# Patient Record
Sex: FEMALE | Race: OTHER | NOT HISPANIC OR LATINO
[De-identification: names, ages, dates, MRNs, and addresses within clinical notes are randomized per-mention and may not be internally consistent; named-entity substitution may affect disease eponyms.]

---

## 2021-07-26 ENCOUNTER — APPOINTMENT (OUTPATIENT)
Dept: PULMONOLOGY | Facility: CLINIC | Age: 51
End: 2021-07-26
Payer: COMMERCIAL

## 2021-07-26 VITALS
DIASTOLIC BLOOD PRESSURE: 89 MMHG | HEIGHT: 71 IN | WEIGHT: 245 LBS | HEART RATE: 90 BPM | BODY MASS INDEX: 34.3 KG/M2 | SYSTOLIC BLOOD PRESSURE: 146 MMHG

## 2021-07-26 DIAGNOSIS — R06.83 SNORING: ICD-10-CM

## 2021-07-26 DIAGNOSIS — Z78.9 OTHER SPECIFIED HEALTH STATUS: ICD-10-CM

## 2021-07-26 DIAGNOSIS — F51.04 PSYCHOPHYSIOLOGIC INSOMNIA: ICD-10-CM

## 2021-07-26 DIAGNOSIS — E03.9 HYPOTHYROIDISM, UNSPECIFIED: ICD-10-CM

## 2021-07-26 DIAGNOSIS — G47.19 OTHER HYPERSOMNIA: ICD-10-CM

## 2021-07-26 PROBLEM — Z00.00 ENCOUNTER FOR PREVENTIVE HEALTH EXAMINATION: Status: ACTIVE | Noted: 2021-07-26

## 2021-07-26 PROCEDURE — 99243 OFF/OP CNSLTJ NEW/EST LOW 30: CPT

## 2021-07-26 PROCEDURE — 99072 ADDL SUPL MATRL&STAF TM PHE: CPT

## 2021-07-26 RX ORDER — LEVOTHYROXINE SODIUM 150 UG/1
150 TABLET ORAL
Qty: 90 | Refills: 0 | Status: ACTIVE | COMMUNITY
Start: 2021-06-17

## 2021-07-26 NOTE — PHYSICAL EXAM
[General Appearance - Well Developed] : well developed [General Appearance - Well Nourished] : well nourished [Low Lying Soft Palate] : low lying soft palate [IV] : IV [Neck Cervical Mass (___cm)] : no neck mass was observed [Jugular Venous Distention Increased] : there was no jugular-venous distention [Heart Sounds] : normal S1 and S2 [Murmurs] : no murmurs [] : no respiratory distress [Auscultation Breath Sounds / Voice Sounds] : lungs were clear to auscultation bilaterally [No Focal Deficits] : no focal deficits [Oriented To Time, Place, And Person] : oriented to person, place, and time [Memory Recent] : recent memory was not impaired [FreeTextEntry1] : no edema

## 2021-07-26 NOTE — HISTORY OF PRESENT ILLNESS
[FreeTextEntry1] : Dr. Brooke Mccarty\par 51 year old woman  with history of thyroid cancer s.p thyroidectomy is here in the sleep center to address sleep apnea.  Patient is sleepy with [place] sleepiness score of 14.  Patient has very loud snoring, also has witnessed apneas.  Patient's bedtime is around 8-11 PM wakes up in the morning around 2 AM she can fall back to sleep with difficulty. She guesses about 4-5 hrs of sleep. She feels tired when she wakes up.  Patient drinks 1-2 cups of coffee during the daytime.  Patient does not have any headaches or nocturia.  She is sleepy while driving.\par [place] score -3\par

## 2021-07-26 NOTE — REASON FOR VISIT
[Initial Evaluation] : an initial evaluation [Sleep Apnea] : sleep apnea [FreeTextEntry2] : inosmnia

## 2021-07-26 NOTE — ASSESSMENT
[FreeTextEntry1] : 51-year-old woman with history of excessive daytime sleepiness and restless sleep, patient has insomnia where she is not able to stay asleep.\par \par She has done sleep studies about 20 years ago, at that time they did not say that she has much of a problem with apnea.\par \par Currently all her symptoms are more severe- will rule out sleep apnea, given the chronic insomnia patient will benefit from an in lab study.\par \par If insomnia does not improve with treating the sleep apnea then will consider medications at the time.

## 2021-07-26 NOTE — REVIEW OF SYSTEMS
[EDS: ESS=____] : daytime somnolence: ESS=[unfilled] [Snoring] : snoring [Witnessed Apneas] : witnessed apnea [Difficulty Maintaining Sleep] : difficulty maintaining sleep [Negative] : Psychiatric